# Patient Record
Sex: FEMALE | Race: WHITE | Employment: FULL TIME | URBAN - METROPOLITAN AREA
[De-identification: names, ages, dates, MRNs, and addresses within clinical notes are randomized per-mention and may not be internally consistent; named-entity substitution may affect disease eponyms.]

---

## 2020-02-25 ENCOUNTER — HOSPITAL ENCOUNTER (EMERGENCY)
Facility: CLINIC | Age: 35
Discharge: HOME | End: 2020-02-25
Attending: EMERGENCY MEDICINE | Admitting: EMERGENCY MEDICINE
Payer: COMMERCIAL

## 2020-02-25 VITALS
TEMPERATURE: 97.5 F | BODY MASS INDEX: 26.52 KG/M2 | HEIGHT: 66 IN | OXYGEN SATURATION: 98 % | HEART RATE: 84 BPM | RESPIRATION RATE: 16 BRPM | WEIGHT: 165 LBS | DIASTOLIC BLOOD PRESSURE: 76 MMHG | SYSTOLIC BLOOD PRESSURE: 113 MMHG

## 2020-02-25 DIAGNOSIS — R42 LIGHTHEADEDNESS: ICD-10-CM

## 2020-02-25 LAB
ALBUMIN SERPL-MCNC: 4.2 G/DL (ref 3.4–5)
ALBUMIN UR-MCNC: NEGATIVE MG/DL
ALP SERPL-CCNC: 73 U/L (ref 40–150)
ALT SERPL W P-5'-P-CCNC: 29 U/L (ref 0–50)
ANION GAP SERPL CALCULATED.3IONS-SCNC: 4 MMOL/L (ref 3–14)
APPEARANCE UR: CLEAR
AST SERPL W P-5'-P-CCNC: 19 U/L (ref 0–45)
BASOPHILS # BLD AUTO: 0 10E9/L (ref 0–0.2)
BASOPHILS NFR BLD AUTO: 0.3 %
BILIRUB SERPL-MCNC: 0.5 MG/DL (ref 0.2–1.3)
BILIRUB UR QL STRIP: NEGATIVE
BUN SERPL-MCNC: 11 MG/DL (ref 7–30)
CALCIUM SERPL-MCNC: 8.6 MG/DL (ref 8.5–10.1)
CHLORIDE SERPL-SCNC: 105 MMOL/L (ref 94–109)
CO2 SERPL-SCNC: 28 MMOL/L (ref 20–32)
COLOR UR AUTO: NORMAL
CREAT SERPL-MCNC: 0.61 MG/DL (ref 0.52–1.04)
DIFFERENTIAL METHOD BLD: NORMAL
EOSINOPHIL # BLD AUTO: 0.1 10E9/L (ref 0–0.7)
EOSINOPHIL NFR BLD AUTO: 1 %
ERYTHROCYTE [DISTWIDTH] IN BLOOD BY AUTOMATED COUNT: 12.2 % (ref 10–15)
GFR SERPL CREATININE-BSD FRML MDRD: >90 ML/MIN/{1.73_M2}
GLUCOSE SERPL-MCNC: 146 MG/DL (ref 70–99)
GLUCOSE UR STRIP-MCNC: NEGATIVE MG/DL
HCG UR QL: NEGATIVE
HCT VFR BLD AUTO: 40.6 % (ref 35–47)
HGB BLD-MCNC: 13.8 G/DL (ref 11.7–15.7)
HGB UR QL STRIP: NEGATIVE
IMM GRANULOCYTES # BLD: 0 10E9/L (ref 0–0.4)
IMM GRANULOCYTES NFR BLD: 0.3 %
INTERPRETATION ECG - MUSE: NORMAL
KETONES UR STRIP-MCNC: NEGATIVE MG/DL
LEUKOCYTE ESTERASE UR QL STRIP: NEGATIVE
LYMPHOCYTES # BLD AUTO: 1.1 10E9/L (ref 0.8–5.3)
LYMPHOCYTES NFR BLD AUTO: 18 %
MCH RBC QN AUTO: 31.4 PG (ref 26.5–33)
MCHC RBC AUTO-ENTMCNC: 34 G/DL (ref 31.5–36.5)
MCV RBC AUTO: 93 FL (ref 78–100)
MONOCYTES # BLD AUTO: 0.4 10E9/L (ref 0–1.3)
MONOCYTES NFR BLD AUTO: 5.7 %
NEUTROPHILS # BLD AUTO: 4.7 10E9/L (ref 1.6–8.3)
NEUTROPHILS NFR BLD AUTO: 74.7 %
NITRATE UR QL: NEGATIVE
NRBC # BLD AUTO: 0 10*3/UL
NRBC BLD AUTO-RTO: 0 /100
PH UR STRIP: 6.5 PH (ref 5–7)
PLATELET # BLD AUTO: 279 10E9/L (ref 150–450)
POTASSIUM SERPL-SCNC: 3.3 MMOL/L (ref 3.4–5.3)
PROT SERPL-MCNC: 8 G/DL (ref 6.8–8.8)
RBC # BLD AUTO: 4.39 10E12/L (ref 3.8–5.2)
RBC #/AREA URNS AUTO: <1 /HPF (ref 0–2)
SODIUM SERPL-SCNC: 137 MMOL/L (ref 133–144)
SOURCE: NORMAL
SP GR UR STRIP: 1 (ref 1–1.03)
SQUAMOUS #/AREA URNS AUTO: <1 /HPF (ref 0–1)
UROBILINOGEN UR STRIP-MCNC: NORMAL MG/DL (ref 0–2)
WBC # BLD AUTO: 6.3 10E9/L (ref 4–11)
WBC #/AREA URNS AUTO: 0 /HPF (ref 0–5)

## 2020-02-25 PROCEDURE — 80053 COMPREHEN METABOLIC PANEL: CPT | Performed by: EMERGENCY MEDICINE

## 2020-02-25 PROCEDURE — 99284 EMERGENCY DEPT VISIT MOD MDM: CPT | Mod: 25

## 2020-02-25 PROCEDURE — 96360 HYDRATION IV INFUSION INIT: CPT

## 2020-02-25 PROCEDURE — 25800030 ZZH RX IP 258 OP 636: Performed by: EMERGENCY MEDICINE

## 2020-02-25 PROCEDURE — 85025 COMPLETE CBC W/AUTO DIFF WBC: CPT | Performed by: EMERGENCY MEDICINE

## 2020-02-25 PROCEDURE — 93005 ELECTROCARDIOGRAM TRACING: CPT

## 2020-02-25 PROCEDURE — 81025 URINE PREGNANCY TEST: CPT | Performed by: EMERGENCY MEDICINE

## 2020-02-25 PROCEDURE — 81001 URINALYSIS AUTO W/SCOPE: CPT | Performed by: EMERGENCY MEDICINE

## 2020-02-25 RX ADMIN — SODIUM CHLORIDE 1000 ML: 9 INJECTION, SOLUTION INTRAVENOUS at 13:25

## 2020-02-25 ASSESSMENT — ENCOUNTER SYMPTOMS
DIARRHEA: 0
NUMBNESS: 0
DIZZINESS: 1
NAUSEA: 0
DYSURIA: 0
NERVOUS/ANXIOUS: 1
VOMITING: 0
LIGHT-HEADEDNESS: 1

## 2020-02-25 ASSESSMENT — MIFFLIN-ST. JEOR: SCORE: 1455.19

## 2020-02-25 NOTE — ED AVS SNAPSHOT
Emergency Department  [address] [facility] [address]  Phone:  [phone]  Fax:  [phone]                                    Pita Johnston   MRN: [id number]    Department:   Emergency Department   Date of Visit:  2/25/2020           After Visit Summary Signature Page    I have received my discharge instructions, and my questions have been answered. I have discussed any challenges I see with this plan with the nurse or doctor.    ..........................................................................................................................................  Patient/Patient Representative Signature      ..........................................................................................................................................  Patient Representative Print Name and Relationship to Patient    ..................................................               ................................................  Date                                   Time    ..........................................................................................................................................  Reviewed by Signature/Title    ...................................................              ..............................................  Date                                               Time          22EPIC Rev 08/18

## 2020-02-25 NOTE — ED TRIAGE NOTES
Feeling dizzy and felt like she was going to faint while in line to get lunch. Tried walking, tried eating sugar. Hx of fainting. Nervous.

## 2020-02-25 NOTE — ED PROVIDER NOTES
"  History     Chief Complaint:  Dizziness (got dizzy while standing in line to get lunch)    The history is provided by the patient.      Pita Johnston is a 35 year old female who presents with dizziness. According to the patient, she experienced a syncopal event about 3 weeks ago, but her EKG was normal and her lab tests all came back normal. She states she has felt normal since the episode until today, when she suddenly felt very lightheaded and dizzy at work. She sat down, had some water, and proceeded to walk back to her desk, but could only go about half way before she needed to sit down again. She explains she thinks she might just be anxious since the first loss of consciousness, but wanted to come in to be checked out again. She denies numbness, tingling, nausea, vomiting, diarrhea, and dysuria. The patient also notes a history of concussion from hockey, including two hits to the head within the last month. She denies possibility of pregnancy.       Allergies:  No Known Allergies     Medications:    Orthocyclen    Past Medical History:    Acute bronchitis    Past Surgical History:    The patient does not have any pertinent past surgical history.    Family History:    No past pertinent family history.     Social History:  Smoking status: never smoker  Alcohol use: yes, every other weekend  Drug use: no  Presents to the ED alone     Review of Systems   Gastrointestinal: Negative for diarrhea, nausea and vomiting.   Genitourinary: Negative for dysuria.   Neurological: Positive for dizziness and light-headedness. Negative for numbness.   Psychiatric/Behavioral: The patient is nervous/anxious.    All other systems reviewed and are negative.        Physical Exam     Patient Vitals for the past 24 hrs:   BP Temp Temp src Heart Rate Resp SpO2 Height Weight   02/25/20 1252 (!) 147/82 97.5  F (36.4  C) Temporal 55 20 100 % 1.676 m (5' 6\") 74.8 kg (165 lb)         Physical Exam  Vitals: reviewed by me  General: Pt seen on " [facility]", [facility], cooperative, and alert to conversation  Eyes: Tracking well, clear conjunctiva BL  ENT: MMM, midline trachea.   Lungs:  No tachypnea, no accessory muscle use. No respiratory distress.   CV: Rate as above, regular rhythm.    Abd: Soft, non tender, no guarding, no rebound. Non distended  MSK: no peripheral edema or joint effusion.  No evidence of trauma  Skin: No rash, normal turgor and temperature  Neuro: Clear speech and no facial droop.  Psych: Not RIS, no e/o AH/VH      Emergency Department Course     ECG:  Indication: dizziness  Time: 1432  Vent. Rate 84 bpm. PA interval 134. QRS duration 86. QT/QTc 398/470. P-R-T axis 50 22 25.  Normal sinus rhythm.  Normal ECG. Read time: 1435     Laboratory:  Laboratory findings were communicated with the patient who voiced understanding of the findings.    CBC: WBC: 6.3, HGB: 13.8, PLT: 279  CMP: Potassium 3.3 (L), Glucose 146 (H) o/w WNL (Creatinine 0.61)    HCG Qualitative urine: negative    UA with Microscopic: WNL     Procedures:  None     Interventions:  1325 NS 1L IV     Emergency Department Course:  Past medical records, nursing notes, and vitals reviewed.    1307 I performed an exam of the patient as documented above.     EKG obtained in the ED, see results above.     IV was inserted and blood was drawn for laboratory testing, results above.    The patient provided a urine sample here in the emergency department. This was sent for laboratory testing, findings above.    1354 Patient rechecked and updated.      1409 Patient rechecked and updated.      Findings and plan explained to the Patient. Patient discharged home with instructions regarding supportive care, medications, and reasons to return. The importance of close follow-up was reviewed.     I personally reviewed the laboratory and imaging results with the Patient and answered all related questions prior to discharge.     Impression & Plan     Medical Decision Making:  Pita Johnston is a  very pleasant 35 year old female who presents to the emergency department today with dizziness. She was lightheaded and has not fainted apart from one time several weeks ago. She has normal vitals signs here, she is not pregnant, normal urinalysis, normal labs and is not anemic. She has a normal neurologic exam here. I don't know if she simply stood up too quickly, or if she has some undiagnosed orthostatic hypotension. She is doing remarkably well here, and I do think she is stable for close outpatient management. I discussed with her as well as with her friend at the bedside that she needs to follow up at the dizzy center, as no clear cause of her dizziness has been found today. Patient states that she feels comfortable doing this. Will plan for discharge as above.      Critical Care Time: was 0 minutes for this patient excluding procedures    Discharge Diagnosis:    ICD-10-CM    1. Lightheadedness R42        Disposition:  Discharged to home.       Scribe Disclosure:  I, Katherin Rocha, am serving as a scribe at 1:07 PM on 2/25/2020 to document services personally performed by Nilson Solano MD based on my observations and the provider's statements to me.    2/25/2020    EMERGENCY DEPARTMENT       Nilson Solano MD  02/25/20 [id number]